# Patient Record
Sex: MALE | Race: AMERICAN INDIAN OR ALASKA NATIVE | NOT HISPANIC OR LATINO | URBAN - METROPOLITAN AREA
[De-identification: names, ages, dates, MRNs, and addresses within clinical notes are randomized per-mention and may not be internally consistent; named-entity substitution may affect disease eponyms.]

---

## 2020-10-24 ENCOUNTER — EMERGENCY (EMERGENCY)
Facility: HOSPITAL | Age: 25
LOS: 1 days | Discharge: HOME | End: 2020-10-24
Attending: EMERGENCY MEDICINE | Admitting: EMERGENCY MEDICINE
Payer: MEDICAID

## 2020-10-24 VITALS
HEART RATE: 67 BPM | DIASTOLIC BLOOD PRESSURE: 52 MMHG | TEMPERATURE: 98 F | OXYGEN SATURATION: 100 % | RESPIRATION RATE: 18 BRPM | SYSTOLIC BLOOD PRESSURE: 109 MMHG

## 2020-10-24 PROCEDURE — 99282 EMERGENCY DEPT VISIT SF MDM: CPT

## 2020-10-24 NOTE — ED PROVIDER NOTE - NSFOLLOWUPINSTRUCTIONS_ED_ALL_ED_FT
Please cut a potato in half, warm it up, and apply it to your ear (please ensure that it is warm but not scalding hot).    Please use over the counter Flonase. Use as directed.     Please follow up with an Ear, Nose, Throat doctor.     Please return to the emergency department for worsening of your symptoms.

## 2020-10-24 NOTE — ED PROVIDER NOTE - ATTENDING CONTRIBUTION TO CARE
negrita: appreciate resident hx: pt with normal exam of ear, no fluid behind drum.  would add flonase, and send to ent outopt    I performed a history and physical exam of the patient and discussed their management with the resident and /or advanced care provider. I reviewed the resident and /or ACP's note and agree with the documented findings and plan of care. My medical decison making and observations are found above.

## 2020-10-24 NOTE — ED PROVIDER NOTE - OBJECTIVE STATEMENT
25 M with no significant pmhx presents to the ED for left ear crackling for past few months, worsening over past 1 week. Pt went to  and was rx'd Pseudoephedrine which has not helped. pt states that he notices the crackling the most with loud noises. Denies acoustic trauma in the past. Has intermittent mild HAs. Denies bleeding from ear, pain with jaw movement, tinnitus, loss of hearing, discharge from ear, neck pain, numbness, weakness, tingling, blurred vision, loss of vision, diplopia. Has not seen ENT or other specialist for this.

## 2020-10-24 NOTE — ED PROVIDER NOTE - NS ED ROS FT
Constitutional: no fevers; no chills  HEENT: no visual changes, no sore throat, no rhinorrhea; Ear crackling  CV: no cp; no palpitations  Resp: no sob; no cough  GI: no abd pain, no nausea, no vomiting, no diarrhea, no constipation  : no dysuria, no hematuria  MSK: no myalgais; no arthralgias  skin: no rashes  neuro: no HA, no numbness; no weakness, no tingling  ROS statement: all other ROS negative except as per HPI

## 2020-10-24 NOTE — ED PROVIDER NOTE - PATIENT PORTAL LINK FT
You can access the FollowMyHealth Patient Portal offered by [facility] by registering at the following website: [email or web address]. By joining [facility]’s FollowMyHealth portal, you will also be able to view your health information using other applications (apps) compatible with our system.

## 2020-10-24 NOTE — ED PROVIDER NOTE - CLINICAL SUMMARY MEDICAL DECISION MAKING FREE TEXT BOX
negrita: appreciate resident hx: pt with normal exam of ear, no fluid behind drum.  would add flonase, and send to ent outopt

## 2020-10-24 NOTE — ED PROVIDER NOTE - PHYSICAL EXAMINATION
PHYSICAL EXAM:  GENERAL: non-toxic appearing; in no respiratory distress  HEAD Atraumatic, Normocephalic  NECK: No JVD; FROM  EYES: PERRL, EOMs intact b/l w/out deficits  CHEST/LUNG: CTAB no wheezes/rhonchi/rales  HEART: RRR no murmur/gallops/rubs  ABDOMEN: +BS, soft, NT, ND  EXTREMITIES: No LE edema, +2 radial pulses b/l, +2 DP/PT pulses b/l  MUSCULOSKELETAL: FROM of all 4 extremities;  NERVOUS SYSTEM:  A&Ox3, No motor deficits or sensory deficits; CNII-XII intact; no focal neurologic deficits  SKIN:  No new rashes

## 2020-10-26 PROBLEM — Z78.9 OTHER SPECIFIED HEALTH STATUS: Chronic | Status: ACTIVE | Noted: 2020-10-24

## 2020-10-26 PROBLEM — Z00.00 ENCOUNTER FOR PREVENTIVE HEALTH EXAMINATION: Status: ACTIVE | Noted: 2020-10-26

## 2020-10-27 ENCOUNTER — APPOINTMENT (OUTPATIENT)
Dept: OTOLARYNGOLOGY | Facility: CLINIC | Age: 25
End: 2020-10-27
Payer: MEDICAID

## 2020-10-27 VITALS — BODY MASS INDEX: 20.54 KG/M2 | HEIGHT: 73 IN | TEMPERATURE: 97.5 F | WEIGHT: 155 LBS | RESPIRATION RATE: 18 BRPM

## 2020-10-27 DIAGNOSIS — H93.8X2 OTHER SPECIFIED DISORDERS OF LEFT EAR: ICD-10-CM

## 2020-10-27 DIAGNOSIS — H69.82 OTHER SPECIFIED DISORDERS OF EUSTACHIAN TUBE, LEFT EAR: ICD-10-CM

## 2020-10-27 DIAGNOSIS — H93.292 OTHER ABNORMAL AUDITORY PERCEPTIONS, LEFT EAR: ICD-10-CM

## 2020-10-27 DIAGNOSIS — Z78.9 OTHER SPECIFIED HEALTH STATUS: ICD-10-CM

## 2020-10-27 PROCEDURE — 92557 COMPREHENSIVE HEARING TEST: CPT

## 2020-10-27 PROCEDURE — 92504 EAR MICROSCOPY EXAMINATION: CPT

## 2020-10-27 PROCEDURE — 99072 ADDL SUPL MATRL&STAF TM PHE: CPT

## 2020-10-27 PROCEDURE — 92567 TYMPANOMETRY: CPT

## 2020-10-27 PROCEDURE — 99204 OFFICE O/P NEW MOD 45 MIN: CPT | Mod: 25

## 2020-10-27 NOTE — HISTORY OF PRESENT ILLNESS
[de-identified] : 25 year old male presents with L ear crackling and intermittent ear pressure, for few months.  Crackling present daily, intermittent, can be severe/bothersome.  Not currently working because of symptoms.  Denies HL,  otorrhea or dizziness.  Pt was seen at [facility] and was treated with flonase x 6 days without any improvement with ear symptoms.  Also grinds teeth/clenches jaw.

## 2020-10-27 NOTE — PROCEDURE
[FreeTextEntry3] : Procedure note:  Binocular microscopy\par \par Oct 27, 2020 \par \par Inspection of the ears was performed under binocular microscopy because of need to accurately visualize otologic landmarks and potential pathologic conditions that would not otherwise be visible through simple otoscopic exam.\par

## 2020-10-27 NOTE — REASON FOR VISIT
[Initial Evaluation] : an initial evaluation for [Other: _____] : [unfilled] [FreeTextEntry2] : left ear pressure accompanied by crackling noises

## 2020-10-27 NOTE — DATA REVIEWED
[de-identified] : I personally reviewed an audiogram, which shows normal hearing bilaterally with type A tympanograms bilaterally.  Word discrimination scores are excellent bilaterally.\par

## 2020-10-29 ENCOUNTER — APPOINTMENT (OUTPATIENT)
Dept: CT IMAGING | Facility: IMAGING CENTER | Age: 25
End: 2020-10-29
Payer: MEDICAID

## 2020-10-29 ENCOUNTER — OUTPATIENT (OUTPATIENT)
Dept: OUTPATIENT SERVICES | Facility: HOSPITAL | Age: 25
LOS: 1 days | End: 2020-10-29
Payer: MEDICAID

## 2020-10-29 DIAGNOSIS — H93.292 OTHER ABNORMAL AUDITORY PERCEPTIONS, LEFT EAR: ICD-10-CM

## 2020-10-29 DIAGNOSIS — H69.82 OTHER SPECIFIED DISORDERS OF EUSTACHIAN TUBE, LEFT EAR: ICD-10-CM

## 2020-10-29 PROCEDURE — 70480 CT ORBIT/EAR/FOSSA W/O DYE: CPT | Mod: 26

## 2020-10-29 PROCEDURE — 70480 CT ORBIT/EAR/FOSSA W/O DYE: CPT

## 2020-11-02 ENCOUNTER — NON-APPOINTMENT (OUTPATIENT)
Age: 25
End: 2020-11-02

## 2020-11-12 ENCOUNTER — TRANSCRIPTION ENCOUNTER (OUTPATIENT)
Age: 25
End: 2020-11-12

## 2022-03-26 ENCOUNTER — EMERGENCY (EMERGENCY)
Facility: HOSPITAL | Age: 27
LOS: 1 days | Discharge: HOME | End: 2022-03-26
Attending: EMERGENCY MEDICINE | Admitting: EMERGENCY MEDICINE
Payer: COMMERCIAL

## 2022-03-26 VITALS
DIASTOLIC BLOOD PRESSURE: 72 MMHG | TEMPERATURE: 98 F | OXYGEN SATURATION: 100 % | RESPIRATION RATE: 17 BRPM | SYSTOLIC BLOOD PRESSURE: 115 MMHG | HEART RATE: 88 BPM

## 2022-03-26 PROCEDURE — 99283 EMERGENCY DEPT VISIT LOW MDM: CPT

## 2022-03-26 NOTE — ED ADULT TRIAGE NOTE - CHIEF COMPLAINT QUOTE
Pt c/ red bumps and hives to forehead and scalp. Pt states this has happen before and went to allergist with no answers. Pt took benadryl with no relief. Denies trouble breathing.

## 2022-03-26 NOTE — ED PROVIDER NOTE - CLINICAL SUMMARY MEDICAL DECISION MAKING FREE TEXT BOX
26 year old male with no reported PMH, presenting with hives x 2d. Appears extremely well here with normal vitals, exam unremarkable with no evidence for urticaria. Questionable allergic reaction vs possibly idiopathic urticaria vs ?mast cell activation syndrome. No indication for treatment or work up here. Will refer to allergy and derm via discharge lounge.

## 2022-03-26 NOTE — ED PROVIDER NOTE - PHYSICAL EXAMINATION
Gen - NAD; well-appearing; A+Ox3   HEENT - NCAT, EOMI, moist mucous membranes  Neck - supple  Resp - CTAB, no increased WOB  CV -  RRR, no m/r/g  Abd - soft, NT, ND; no guarding or rebound  MSK - 5/5 strength and FROM b/l UE and LE  Extrem - no LE edema/erythema/tenderness  Neuro - no focal motor or sensation deficits  Skin - no appreciable urticarial or rash throughout, some scattered acneiform papules over back

## 2022-03-26 NOTE — ED PROVIDER NOTE - NSFOLLOWUPINSTRUCTIONS_ED_ALL_ED_FT
You were seen in the Emergency Department for: hives    If your hives return, please take Benadryl immediately.    Please follow up with an allergist and dermatologist as discussed. You were referred to our Discharge Lounge and someone will call you within 24-48 hours to help set up an appointment. If you are not contacted within that time, please call [phone] to find a provider who is convenient for you.    You should return to the Emergency Department if you feel any new/worsening/persistent symptoms including but not limited to: chest pain, difficulty breathing, facial/throat swelling, abdominal pain, vomiting, loss of consciousness, numbness/weakness of a body part

## 2022-03-26 NOTE — ED PROVIDER NOTE - ATTENDING CONTRIBUTION TO CARE
I have personally performed a face to face bedside history and physical examination of this patient. I have discussed the history, examination, review of systems, assessment and plan of management with the resident. I have reviewed the electronic medical record and amended it to reflect my history, review of systems, physical exam, assessment and plan.    Brief HPI:  25 yo M no reported PMH, presenting with itching rash and hives x2 days.  Patient states rash is on face, back of neck and back.  Denies allergies, insect bites, recent travel, new detergents or soaps, medications or supplements.  Patient states he has similar symptoms many years ago where he visited allergist which could not find allergen.  Denies throat swelling, sob, nausea, vomiting, dizziness.  Patient states sx have improved with benadryl and is less severe now in ED.     Vitals:   Reviewed    Exam:    GEN:  Non-toxic appearing, non-distressed, speaking full sentences, non-diaphoretic, AAOx3  HEENT:  NCAT, neck supple, EOMI, PERRLA, sclera anicteric, no conjunctival pallor or injection, no stridor, normal voice, no tonsillar exudate, uvula midline  CV:  regular rhythm and rate, s1/s2 audible, no murmurs, rubs or gallops, peripheral pulses 2+ and symmetric  PULM:  non-labored respirations, lungs clear to auscultation bilaterally, no wheezes, crackles or rales  ABD:  non distended, non-tender, no rebound, no guarding, negative Lucero's sign, bowel sounds normal, no cvat  MSK:  no gross deformity, non-tender extremities and joints, range of motion grossly normal appearing, no extremity edema, extremities warm and well perfused   NEURO:  AAOx3, CN II-XII intact, motor 5/5 in upper and lower extremities bilaterally, sensation grossly intact in extremities and trunk, finger to nose testing wnl, no nystagmus, negative Romberg, no pronator drift, no gait deficit  SKIN:   lacy erythema over forehead; subcentimeter wheals over left scapular area     A/P:  25 yo M no reported PMH, presenting with itching rash and hives x2 days.  Exam consistent with likely urticaria, unk trigger.  No c/f anaphylaxis or infectious etiology at [facility].  Will provide supportive care, allergy/derm follow up.  Anticipate discharge.

## 2022-03-26 NOTE — ED PROVIDER NOTE - PATIENT PORTAL LINK FT
You can access the FollowMyHealth Patient Portal offered by [facility] by registering at the following website: [email or web address]. By joining [facility]’s FollowMyHealth portal, you will also be able to view your health information using other applications (apps) compatible with our system.

## 2022-03-26 NOTE — ED PROVIDER NOTE - OBJECTIVE STATEMENT
26 year old male with no reported PMH, presenting with hives 26 year old male with no reported PMH, presenting with hives x 2d. States that since young age having intermittent hives, last episode 4-5 years ago, with no identifiable triggers. Seen by allergist in 2013 with negative work up. Few days ago had onset of hives over forehead and dorsal hands, last night took benadryl, now resolved. Denies difficulty breathing, facial/mouth/throat swelling, chest/abdominal pain, confusion, diaphoresis. Has minimal pruritus with episodes.